# Patient Record
(demographics unavailable — no encounter records)

---

## 2025-03-07 NOTE — REASON FOR VISIT
[Consultation] : a consultation visit [FreeTextEntry1] : Kindly asked by Dr. Corcoran to consult and evaluate patient for loose stools                      A copy of this note is being sent to physician requesting consultation.

## 2025-03-07 NOTE — CONSULT LETTER
[FreeTextEntry1] : Dear Dr. LASHANDA CANCHOLA ,  I had the pleasure of evaluating your patient,  VU VELASCO.  Please refer to my note below.  Thank you very much for allowing me to participate in the care of this patient.  If you have any questions, please do not hesitate to contact me.  Sincerely,   Jerzy Flores MD

## 2025-03-07 NOTE — HISTORY OF PRESENT ILLNESS
[FreeTextEntry1] : 26m 3mo hx intermittent LLQ pain/cramps with loose stools 2-4x/d most days per wk. Scant blood-tinge on some occasion. Some days normal (no sx ) - eg. today.  No pain now.  Brother w/ Crohn's. No wt loss.  No triggers.  Soc:  no tobacco or significant EtOH FHx: b- Crohn's  ROS: Constitutional:: no weight loss, fevers ENT: no deafness Eyes: not blind Neck: no LN Chest: no dyspnea/cough Cardiac: no chest pain Vascular: no leg swelling GI: no abdominal pain, nausea, vomiting, diarrhea, constipation, rectal bleeding, dysphagia, melena unless otherwise noted in HPI : no dysuria, dark urine Skin: no rashes, jaundice Heme: no bleeding Endocrine: no DM unless otherwise stated in HPI  Px: (VS noted below) General: NAD Eyes: anicteric Oropharynx:  clear Neck: no LN Chest: normal respiratory effort CVS: regular Abd: soft, NT, ND, +BS, no HSM Ext: no atrophy Neuro: grossly nonfocal  Labs/imaging/prior endoscopic results reviewed to the extent available and noted in HPI

## 2025-03-07 NOTE — ASSESSMENT
[FreeTextEntry1] : Possible postinfx IBS, vs IBD new onset.  Stools, labs as ordered, then will likely need colonoscopy.  PMD/consultation/hospital notes and Labs/imaging/prior endoscopic results reviewed to extent noted in HPI; and, if procedure code billed on this visit for lab draw, this serves to signify that labs were drawn here in this office. Pt also advised that any studies ordered, if prior authorization required it may take up to a week to confirm - and if pt has not heard RE: scheduling by a week, they should call this office.